# Patient Record
Sex: FEMALE | Race: WHITE | URBAN - METROPOLITAN AREA
[De-identification: names, ages, dates, MRNs, and addresses within clinical notes are randomized per-mention and may not be internally consistent; named-entity substitution may affect disease eponyms.]

---

## 2021-02-23 ENCOUNTER — OFFICE VISIT (OUTPATIENT)
Dept: URBAN - METROPOLITAN AREA CLINIC 23 | Facility: CLINIC | Age: 31
End: 2021-02-23

## 2021-03-31 ENCOUNTER — OFFICE VISIT (OUTPATIENT)
Dept: URBAN - METROPOLITAN AREA CLINIC 19 | Facility: CLINIC | Age: 31
End: 2021-03-31
Payer: COMMERCIAL

## 2021-03-31 ENCOUNTER — WEB ENCOUNTER (OUTPATIENT)
Dept: URBAN - METROPOLITAN AREA CLINIC 19 | Facility: CLINIC | Age: 31
End: 2021-03-31

## 2021-03-31 DIAGNOSIS — R19.8 ALTERNATING CONSTIPATION AND DIARRHEA: ICD-10-CM

## 2021-03-31 PROCEDURE — 99203 OFFICE O/P NEW LOW 30 MIN: CPT | Performed by: INTERNAL MEDICINE

## 2021-03-31 RX ORDER — NITROGLYCERIN 0.4 MG/1
AS DIRECTED TABLET SUBLINGUAL
Status: ACTIVE | COMMUNITY

## 2021-03-31 RX ORDER — OXYCODONE HYDROCHLORIDE 10 MG/1
1 TABLET AS NEEDED TABLET ORAL
Status: ACTIVE | COMMUNITY

## 2021-03-31 RX ORDER — ONDANSETRON 4 MG/1
1 TABLET ON THE TONGUE AND ALLOW TO DISSOLVE TABLET, ORALLY DISINTEGRATING ORAL ONCE A DAY
Status: ACTIVE | COMMUNITY

## 2021-03-31 RX ORDER — ERENUMAB-AOOE 70 MG/ML
AS DIRECTED INJECTION SUBCUTANEOUS
Status: ACTIVE | COMMUNITY

## 2021-03-31 NOTE — HPI-TODAY'S VISIT:
Mrs. Love is a 30 year old female who was referred by Dr. Nicholas Jackson/Rosanna Mcclure NP for constipation. A copy of this note will be sent to her referring physician.  Mrs. Love reports having a history of SVT and sick sinus syndrome. She reports having a pacemaker in place since 2011.   She has intracranial hypertension for which she has a shunt.   She reports having systemic capillary leak syndrome.   In regards to constipation she reports she can go 2 weeks without having a BM and then she will have diarrhea. She reports the cycle has been repeating most of her life.   She reports trying metamucil, stool softners for regularity.

## 2021-05-19 ENCOUNTER — OFFICE VISIT (OUTPATIENT)
Dept: URBAN - METROPOLITAN AREA CLINIC 19 | Facility: CLINIC | Age: 31
End: 2021-05-19

## 2021-05-19 RX ORDER — OXYCODONE HYDROCHLORIDE 10 MG/1
1 TABLET AS NEEDED TABLET ORAL
Status: ACTIVE | COMMUNITY

## 2021-05-19 RX ORDER — ERENUMAB-AOOE 70 MG/ML
AS DIRECTED INJECTION SUBCUTANEOUS
Status: ACTIVE | COMMUNITY

## 2021-05-19 RX ORDER — NITROGLYCERIN 0.4 MG/1
AS DIRECTED TABLET SUBLINGUAL
Status: ACTIVE | COMMUNITY

## 2021-05-19 RX ORDER — ONDANSETRON 4 MG/1
1 TABLET ON THE TONGUE AND ALLOW TO DISSOLVE TABLET, ORALLY DISINTEGRATING ORAL ONCE A DAY
Status: ACTIVE | COMMUNITY

## 2021-06-30 ENCOUNTER — WEB ENCOUNTER (OUTPATIENT)
Dept: URBAN - METROPOLITAN AREA CLINIC 19 | Facility: CLINIC | Age: 31
End: 2021-06-30

## 2021-06-30 ENCOUNTER — OFFICE VISIT (OUTPATIENT)
Dept: URBAN - METROPOLITAN AREA CLINIC 19 | Facility: CLINIC | Age: 31
End: 2021-06-30
Payer: COMMERCIAL

## 2021-06-30 DIAGNOSIS — K59.01 CONSTIPATION BY DELAYED COLONIC TRANSIT: ICD-10-CM

## 2021-06-30 PROBLEM — 35298007: Status: ACTIVE | Noted: 2021-06-30

## 2021-06-30 PROCEDURE — 99214 OFFICE O/P EST MOD 30 MIN: CPT | Performed by: INTERNAL MEDICINE

## 2021-06-30 RX ORDER — OXYCODONE HYDROCHLORIDE 10 MG/1
1 TABLET AS NEEDED TABLET ORAL
Status: ACTIVE | COMMUNITY

## 2021-06-30 RX ORDER — ERENUMAB-AOOE 70 MG/ML
AS DIRECTED INJECTION SUBCUTANEOUS
Status: ACTIVE | COMMUNITY

## 2021-06-30 RX ORDER — NITROGLYCERIN 0.4 MG/1
AS DIRECTED TABLET SUBLINGUAL
Status: ACTIVE | COMMUNITY

## 2021-06-30 RX ORDER — ONDANSETRON 4 MG/1
1 TABLET ON THE TONGUE AND ALLOW TO DISSOLVE TABLET, ORALLY DISINTEGRATING ORAL ONCE A DAY
Status: ACTIVE | COMMUNITY

## 2021-06-30 RX ORDER — LINACLOTIDE 145 UG/1
1 CAPSULE AT LEAST 30 MINUTES BEFORE THE FIRST MEAL OF THE DAY ON AN EMPTY STOMACH CAPSULE, GELATIN COATED ORAL ONCE A DAY
Qty: 90 | Refills: 3 | OUTPATIENT
Start: 2021-06-30 | End: 2022-06-25

## 2021-06-30 NOTE — HPI-TODAY'S VISIT:
Mrs. Love is a 30 year old female who was last seen in GI clinic on 3/31/2021 for constipation.    At time of last clinic visit we recommended miralax. She reports it helped initially but she still has "rabbit pellet" stools. She reports having to strain.   Prior history is summarized below:  -Mrs. Love reports having a history of SVT and sick sinus syndrome. She reports having a pacemaker in place since 2011. She reports having systemic capillary leak syndrome. She has intracranial hypertension for which she has a shunt.  -On 3/31/2021 she reported she can go 2 weeks without having a BM and then she will have diarrhea. She reports the cycle has been repeating most of her life.She reports trying metamucil, stool softners for regularity.

## 2021-08-13 ENCOUNTER — DASHBOARD ENCOUNTERS (OUTPATIENT)
Age: 31
End: 2021-08-13

## 2021-08-18 ENCOUNTER — OFFICE VISIT (OUTPATIENT)
Dept: URBAN - METROPOLITAN AREA CLINIC 19 | Facility: CLINIC | Age: 31
End: 2021-08-18

## 2021-08-18 RX ORDER — LINACLOTIDE 145 UG/1
1 CAPSULE AT LEAST 30 MINUTES BEFORE THE FIRST MEAL OF THE DAY ON AN EMPTY STOMACH CAPSULE, GELATIN COATED ORAL ONCE A DAY
Qty: 90 | Refills: 3 | Status: ACTIVE | COMMUNITY
Start: 2021-06-30 | End: 2022-06-25

## 2021-08-18 RX ORDER — ERENUMAB-AOOE 70 MG/ML
AS DIRECTED INJECTION SUBCUTANEOUS
Status: ACTIVE | COMMUNITY

## 2021-08-18 RX ORDER — NITROGLYCERIN 0.4 MG/1
AS DIRECTED TABLET SUBLINGUAL
Status: ACTIVE | COMMUNITY

## 2021-08-18 RX ORDER — ONDANSETRON 4 MG/1
1 TABLET ON THE TONGUE AND ALLOW TO DISSOLVE TABLET, ORALLY DISINTEGRATING ORAL ONCE A DAY
Status: ACTIVE | COMMUNITY

## 2021-08-18 RX ORDER — OXYCODONE HYDROCHLORIDE 10 MG/1
1 TABLET AS NEEDED TABLET ORAL
Status: ACTIVE | COMMUNITY

## 2021-10-06 ENCOUNTER — OFFICE VISIT (OUTPATIENT)
Dept: URBAN - METROPOLITAN AREA CLINIC 19 | Facility: CLINIC | Age: 31
End: 2021-10-06

## 2021-12-01 ENCOUNTER — OFFICE VISIT (OUTPATIENT)
Dept: URBAN - METROPOLITAN AREA CLINIC 19 | Facility: CLINIC | Age: 31
End: 2021-12-01

## 2022-08-11 ENCOUNTER — OFFICE VISIT (OUTPATIENT)
Dept: URBAN - METROPOLITAN AREA CLINIC 19 | Facility: CLINIC | Age: 32
End: 2022-08-11

## 2023-02-22 ENCOUNTER — OFFICE VISIT (OUTPATIENT)
Dept: URBAN - METROPOLITAN AREA CLINIC 19 | Facility: CLINIC | Age: 33
End: 2023-02-22

## 2023-02-22 NOTE — HPI-TODAY'S VISIT:
Mrs. Love is a 30 year old female who was last seen in GI clinic on 3/31/2021 for constipation.    At time of last clinic visit we recommended miralax. She reports it helped initially but she still has "rabbit pellet" stools. She reports having to strain.   Prior history is summarized below:  -Mrs. Love reports having a history of SVT and sick sinus syndrome. She reports having a pacemaker in place since 2011. She reports having systemic capillary leak syndrome. She has intracranial hypertension for which she has a shunt.  -On 3/31/2021 she reported she can go 2 weeks without having a BM and then she will have diarrhea. She reports the cycle has been repeating most of her life.She reports trying metamucil, stool softners for regularity. 6/30/2021 Dr. Love she was placed on Linzess 145 for constipation if in the adequate relief we will consider rectal manometry. 32-year-old female presents to the office for acid reflux and abdominal/chest pain.

## 2023-03-01 ENCOUNTER — OFFICE VISIT (OUTPATIENT)
Dept: URBAN - METROPOLITAN AREA CLINIC 19 | Facility: CLINIC | Age: 33
End: 2023-03-01

## 2023-03-01 RX ORDER — ONDANSETRON 4 MG/1
1 TABLET ON THE TONGUE AND ALLOW TO DISSOLVE TABLET, ORALLY DISINTEGRATING ORAL ONCE A DAY
Status: ACTIVE | COMMUNITY

## 2023-03-01 RX ORDER — NITROGLYCERIN 0.4 MG/1
AS DIRECTED TABLET SUBLINGUAL
Status: ACTIVE | COMMUNITY

## 2023-03-01 RX ORDER — ERENUMAB-AOOE 70 MG/ML
AS DIRECTED INJECTION SUBCUTANEOUS
Status: ACTIVE | COMMUNITY

## 2023-03-01 RX ORDER — OXYCODONE HYDROCHLORIDE 10 MG/1
1 TABLET AS NEEDED TABLET ORAL
Status: ACTIVE | COMMUNITY

## 2023-03-01 NOTE — HPI-TODAY'S VISIT:
Mrs. Love is a 30 year old female who was last seen in GI clinic on 3/31/2021 for constipation.     Prior history is summarized below:  -Mrs. Love reports having a history of SVT and sick sinus syndrome. She reports having a pacemaker in place since 2011. She reports having systemic capillary leak syndrome. She has intracranial hypertension for which she has a shunt.  -On 3/31/2021 she reported she can go 2 weeks without having a BM and then she will have diarrhea. She reports the cycle has been repeating most of her life.She reports trying metamucil, stool softners for regularity. 6/30/2021 Dr. Love she was placed on Linzess 145 for constipation if in the adequate relief we will consider rectal manometry.  At time of last clinic visit we recommended miralax. She reports it helped initially but she still has "rabbit pellet" stools. She reports having to strain.